# Patient Record
Sex: MALE | Race: WHITE | NOT HISPANIC OR LATINO
[De-identification: names, ages, dates, MRNs, and addresses within clinical notes are randomized per-mention and may not be internally consistent; named-entity substitution may affect disease eponyms.]

---

## 2017-04-23 ENCOUNTER — RECORD ABSTRACTING (OUTPATIENT)
Age: 82
End: 2017-04-23

## 2017-04-23 DIAGNOSIS — E55.9 VITAMIN D DEFICIENCY, UNSPECIFIED: ICD-10-CM

## 2017-04-23 DIAGNOSIS — I70.209 UNSPECIFIED ATHEROSCLEROSIS OF NATIVE ARTERIES OF EXTREMITIES, UNSPECIFIED EXTREMITY: ICD-10-CM

## 2017-04-23 DIAGNOSIS — Z80.9 FAMILY HISTORY OF MALIGNANT NEOPLASM, UNSPECIFIED: ICD-10-CM

## 2017-04-23 DIAGNOSIS — D75.89 OTHER SPECIFIED DISEASES OF BLOOD AND BLOOD-FORMING ORGANS: ICD-10-CM

## 2017-04-23 DIAGNOSIS — N20.0 CALCULUS OF KIDNEY: ICD-10-CM

## 2017-04-23 DIAGNOSIS — B19.10 UNSPECIFIED VIRAL HEPATITIS B W/OUT HEPATIC COMA: ICD-10-CM

## 2017-04-23 DIAGNOSIS — R73.9 HYPERGLYCEMIA, UNSPECIFIED: ICD-10-CM

## 2017-04-23 DIAGNOSIS — K63.5 POLYP OF COLON: ICD-10-CM

## 2017-04-23 DIAGNOSIS — I65.23 OCCLUSION AND STENOSIS OF BILATERAL CAROTID ARTERIES: ICD-10-CM

## 2017-04-23 DIAGNOSIS — M10.9 GOUT, UNSPECIFIED: ICD-10-CM

## 2017-04-23 DIAGNOSIS — Z78.9 OTHER SPECIFIED HEALTH STATUS: ICD-10-CM

## 2017-04-23 DIAGNOSIS — I51.9 HEART DISEASE, UNSPECIFIED: ICD-10-CM

## 2017-04-23 DIAGNOSIS — I70.0 ATHEROSCLEROSIS OF AORTA: ICD-10-CM

## 2017-04-23 DIAGNOSIS — M19.90 UNSPECIFIED OSTEOARTHRITIS, UNSPECIFIED SITE: ICD-10-CM

## 2017-04-23 DIAGNOSIS — N41.9 INFLAMMATORY DISEASE OF PROSTATE, UNSPECIFIED: ICD-10-CM

## 2017-04-23 DIAGNOSIS — Z92.89 PERSONAL HISTORY OF OTHER MEDICAL TREATMENT: ICD-10-CM

## 2017-04-23 DIAGNOSIS — C84.00 MYCOSIS FUNGOIDES, UNSPECIFIED SITE: ICD-10-CM

## 2017-04-27 ENCOUNTER — APPOINTMENT (OUTPATIENT)
Dept: INTERNAL MEDICINE | Facility: CLINIC | Age: 82
End: 2017-04-27

## 2017-07-11 ENCOUNTER — APPOINTMENT (OUTPATIENT)
Dept: INTERNAL MEDICINE | Facility: CLINIC | Age: 82
End: 2017-07-11

## 2017-07-11 VITALS
TEMPERATURE: 97.4 F | DIASTOLIC BLOOD PRESSURE: 72 MMHG | SYSTOLIC BLOOD PRESSURE: 142 MMHG | OXYGEN SATURATION: 98 % | HEIGHT: 72 IN | WEIGHT: 164 LBS | RESPIRATION RATE: 16 BRPM | HEART RATE: 80 BPM | BODY MASS INDEX: 22.21 KG/M2

## 2017-07-11 DIAGNOSIS — F32.9 MAJOR DEPRESSIVE DISORDER, SINGLE EPISODE, UNSPECIFIED: ICD-10-CM

## 2017-07-11 DIAGNOSIS — R63.4 ABNORMAL WEIGHT LOSS: ICD-10-CM

## 2017-07-19 LAB
APPEARANCE: CLEAR
BACTERIA: NEGATIVE
BILIRUBIN URINE: NEGATIVE
BLOOD URINE: NEGATIVE
COLOR: YELLOW
GLUCOSE QUALITATIVE U: NORMAL MG/DL
HYALINE CASTS: 3 /LPF
KETONES URINE: NEGATIVE
LEUKOCYTE ESTERASE URINE: ABNORMAL
MICROSCOPIC-UA: NORMAL
NITRITE URINE: NEGATIVE
PH URINE: 6
PROTEIN URINE: NEGATIVE MG/DL
RED BLOOD CELLS URINE: 2 /HPF
SPECIFIC GRAVITY URINE: 1.02
SQUAMOUS EPITHELIAL CELLS: 1 /HPF
UROBILINOGEN URINE: NORMAL MG/DL
WHITE BLOOD CELLS URINE: 4 /HPF

## 2018-01-04 ENCOUNTER — APPOINTMENT (OUTPATIENT)
Dept: INTERNAL MEDICINE | Facility: CLINIC | Age: 83
End: 2018-01-04

## 2018-02-09 ENCOUNTER — APPOINTMENT (OUTPATIENT)
Dept: INTERNAL MEDICINE | Facility: CLINIC | Age: 83
End: 2018-02-09
Payer: MEDICARE

## 2018-02-09 ENCOUNTER — NON-APPOINTMENT (OUTPATIENT)
Age: 83
End: 2018-02-09

## 2018-02-09 VITALS
DIASTOLIC BLOOD PRESSURE: 82 MMHG | SYSTOLIC BLOOD PRESSURE: 142 MMHG | BODY MASS INDEX: 24.11 KG/M2 | TEMPERATURE: 97.7 F | WEIGHT: 178 LBS | HEART RATE: 72 BPM | RESPIRATION RATE: 18 BRPM | OXYGEN SATURATION: 99 % | HEIGHT: 72 IN

## 2018-02-09 PROCEDURE — 93000 ELECTROCARDIOGRAM COMPLETE: CPT

## 2018-02-09 PROCEDURE — 99214 OFFICE O/P EST MOD 30 MIN: CPT | Mod: 25

## 2018-06-18 ENCOUNTER — RX RENEWAL (OUTPATIENT)
Age: 83
End: 2018-06-18

## 2018-07-02 ENCOUNTER — RX RENEWAL (OUTPATIENT)
Age: 83
End: 2018-07-02

## 2018-08-17 ENCOUNTER — APPOINTMENT (OUTPATIENT)
Dept: INTERNAL MEDICINE | Facility: CLINIC | Age: 83
End: 2018-08-17
Payer: MEDICARE

## 2018-08-17 VITALS
RESPIRATION RATE: 16 BRPM | HEIGHT: 72 IN | OXYGEN SATURATION: 97 % | DIASTOLIC BLOOD PRESSURE: 84 MMHG | WEIGHT: 180 LBS | BODY MASS INDEX: 24.38 KG/M2 | SYSTOLIC BLOOD PRESSURE: 140 MMHG | HEART RATE: 80 BPM | TEMPERATURE: 97.7 F

## 2018-08-17 PROCEDURE — G0438: CPT

## 2018-08-17 RX ORDER — ASPIRIN 81 MG/1
81 TABLET ORAL DAILY
Refills: 0 | Status: ACTIVE | COMMUNITY

## 2018-08-17 RX ORDER — CHROMIUM 200 MCG
TABLET ORAL
Refills: 0 | Status: ACTIVE | COMMUNITY

## 2018-08-17 RX ORDER — MULTIVITAMIN
TABLET ORAL DAILY
Refills: 0 | Status: ACTIVE | COMMUNITY

## 2018-08-17 RX ORDER — BUPROPION HYDROCHLORIDE 100 MG/1
100 TABLET, FILM COATED, EXTENDED RELEASE ORAL TWICE DAILY
Qty: 60 | Refills: 11 | Status: DISCONTINUED | COMMUNITY
Start: 2017-07-11 | End: 2018-08-17

## 2018-08-17 RX ORDER — VIT A/VIT C/VIT E/ZINC/COPPER 4296-226
CAPSULE ORAL
Refills: 0 | Status: ACTIVE | COMMUNITY

## 2018-08-17 NOTE — REVIEW OF SYSTEMS
[Negative] : Psychiatric [FreeTextEntry9] : See history of present illness [de-identified] : See history of present illness

## 2018-08-17 NOTE — HEALTH RISK ASSESSMENT
[Good] : ~his/her~ current health as good [Very Good] : ~his/her~  mood as very good [No falls in past year] : Patient reported no falls in the past year [0] : 2) Feeling down, depressed, or hopeless: Not at all (0) [Patient declined mammogram] : Patient declined mammogram [Patient declined PAP Smear] : Patient declined PAP Smear [Patient declined bone density test] : Patient declined bone density test [Patient reported colonoscopy was normal] : Patient reported colonoscopy was normal [HIV test declined] : HIV test declined [Hepatitis C test declined] : Hepatitis C test declined [None] : None [With Significant Other] : lives with significant other [# of Members in Household ___] :  household currently consist of [unfilled] member(s) [Retired] : retired [College] : College [] :  [# Of Children ___] : has [unfilled] children [Feels Safe at Home] : Feels safe at home [Fully functional (bathing, dressing, toileting, transferring, walking, feeding)] : Fully functional (bathing, dressing, toileting, transferring, walking, feeding) [Fully functional (using the telephone, shopping, preparing meals, housekeeping, doing laundry, using] : Fully functional and needs no help or supervision to perform IADLs (using the telephone, shopping, preparing meals, housekeeping, doing laundry, using transportation, managing medications and managing finances) [Smoke Detector] : smoke detector [Carbon Monoxide Detector] : carbon monoxide detector [Guns at Home] : guns at home [Safety elements used in home] : safety elements used in home [Seat Belt] :  uses seat belt [Sunscreen] : uses sunscreen [] : No [de-identified] : drinks red wine with dinner 4-5/week [Change in mental status noted] : No change in mental status noted [Language] : denies difficulty with language [Behavior] : denies difficulty with behavior [Learning/Retaining New Information] : denies difficulty learning/retaining new information [Handling Complex Tasks] : denies difficulty handling complex tasks [Reasoning] : denies difficulty with reasoning [Spatial Ability and Orientation] : denies difficulty with spatial ability and orientation [Sexually Active] : not sexually active [High Risk Behavior] : no high risk behavior [Reports changes in hearing] : Reports no changes in hearing [Reports changes in vision] : Reports no changes in vision [Reports changes in dental health] : Reports no changes in dental health [Travel to Developing Areas] : does not  travel to developing areas [TB Exposure] : is not being exposed to tuberculosis [Caregiver Concerns] : does not have caregiver concerns [ColonoscopyDate] : 2012

## 2018-08-17 NOTE — HISTORY OF PRESENT ILLNESS
[de-identified] : The patient for a wellness evaluation.\par \par Overall, at this time, the patient states that he is fairly stable. 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 He continues to receive Keytruda every 3 weeks intravenously. His last set of CAT scans were performed in April of this year, and he states that they were stable. I never received the results unfortunately. His next set of radiographic studies is due next month. He does complain of mild and generalized fatigue. He sleeps approximately 10 hours per night.\par \par The patient has not been exercising much due to the significant lymphedema of the right lower extremity. He does wear a lymphedema pump every night. He denies having any fevers chills night sweats. His appetite is excellent. There has been no significant change in his weight.\par \par Patient has been seen by his cardiologist. He underwent a carotid duplex study which revealed a mild to moderate amount of plaque. He was told to just remain on aspirin therapy. He denies any chest pains. There has been no change in bowel habits. There are no other constitutional symptoms at this time. He now comes in for this assessment.

## 2018-08-17 NOTE — PHYSICAL EXAM
[General Appearance - Alert] : alert [General Appearance - In No Acute Distress] : in no acute distress [Sclera] : the sclera and conjunctiva were normal [PERRL With Normal Accommodation] : pupils were equal in size, round, and reactive to light [Extraocular Movements] : extraocular movements were intact [Outer Ear] : the ears and nose were normal in appearance [Oropharynx] : the oropharynx was normal [Neck Appearance] : the appearance of the neck was normal [Neck Cervical Mass (___cm)] : no neck mass was observed [Jugular Venous Distention Increased] : there was no jugular-venous distention [Thyroid Diffuse Enlargement] : the thyroid was not enlarged [Thyroid Nodule] : there were no palpable thyroid nodules [Heart Sounds] : normal S1 and S2 [Heart Sounds Pericardial Friction Rub] : no pericardial rub [Bowel Sounds] : normal bowel sounds [Abdomen Soft] : soft [Abdomen Tenderness] : non-tender [] : no hepato-splenomegaly [FreeTextEntry1] : Right inguinal fullness [No CVA Tenderness] : no ~M costovertebral angle tenderness [Nail Clubbing] : no clubbing  or cyanosis of the fingernails

## 2018-08-17 NOTE — PLAN
[FreeTextEntry1] : 1. Continue with medication as outlined above.\par \par 2. Continue with close oncologic followup with his team at [facility] regarding the treatment of his metastatic squamous cell carcinoma. He will continue with the infusions of Keytruda under their direction. He states that he may go to a once a month regimen in the near future.\par \par 3. Await the results of the followup radiographic studies which are scheduled for September. I told him that I would appreciate the results for my review especially of the chest, given the fact that he did have some nodular densities noted previously.\par \par 4. The patient will contact his gastroenterologist, Dr. Us regarding the timing of his next colonoscopy which is due.\par \par 5. Continue with the lymphedema pump nocturnally.\par \par 6. The patient has been instructed to increase his vitamin D to 4000 units daily.\par \par 7. Routine urologic followup with Dr. Duran.\par \par 8. Follow up with myself in 6 months with office visit and a blood pressure check.

## 2018-11-09 ENCOUNTER — OUTPATIENT (OUTPATIENT)
Dept: OUTPATIENT SERVICES | Facility: HOSPITAL | Age: 83
LOS: 1 days | End: 2018-11-09
Payer: MEDICARE

## 2018-11-09 ENCOUNTER — APPOINTMENT (OUTPATIENT)
Dept: INTERNAL MEDICINE | Facility: CLINIC | Age: 83
End: 2018-11-09
Payer: MEDICARE

## 2018-11-09 ENCOUNTER — APPOINTMENT (OUTPATIENT)
Dept: RADIOLOGY | Facility: CLINIC | Age: 83
End: 2018-11-09

## 2018-11-09 VITALS
RESPIRATION RATE: 16 BRPM | BODY MASS INDEX: 24.52 KG/M2 | OXYGEN SATURATION: 98 % | DIASTOLIC BLOOD PRESSURE: 82 MMHG | WEIGHT: 181 LBS | HEIGHT: 72 IN | TEMPERATURE: 98.8 F | SYSTOLIC BLOOD PRESSURE: 140 MMHG | HEART RATE: 78 BPM

## 2018-11-09 DIAGNOSIS — Z00.8 ENCOUNTER FOR OTHER GENERAL EXAMINATION: ICD-10-CM

## 2018-11-09 DIAGNOSIS — Z86.79 PERSONAL HISTORY OF OTHER DISEASES OF THE CIRCULATORY SYSTEM: ICD-10-CM

## 2018-11-09 LAB
ANION GAP SERPL CALC-SCNC: 11 MMOL/L
APPEARANCE: ABNORMAL
BACTERIA: NEGATIVE
BASOPHILS # BLD AUTO: 0.03 K/UL
BASOPHILS NFR BLD AUTO: 0.3 %
BILIRUBIN URINE: NEGATIVE
BLOOD URINE: NEGATIVE
BUN SERPL-MCNC: 24 MG/DL
CALCIUM SERPL-MCNC: 9.3 MG/DL
CHLORIDE SERPL-SCNC: 100 MMOL/L
CO2 SERPL-SCNC: 26 MMOL/L
COLOR: YELLOW
CREAT SERPL-MCNC: 0.87 MG/DL
EOSINOPHIL # BLD AUTO: 0.07 K/UL
EOSINOPHIL NFR BLD AUTO: 0.6 %
GLUCOSE QUALITATIVE U: NEGATIVE MG/DL
GLUCOSE SERPL-MCNC: 96 MG/DL
HCT VFR BLD CALC: 37.8 %
HGB BLD-MCNC: 12.7 G/DL
HYALINE CASTS: 4 /LPF
IMM GRANULOCYTES NFR BLD AUTO: 0.3 %
KETONES URINE: NEGATIVE
LEUKOCYTE ESTERASE URINE: ABNORMAL
LYMPHOCYTES # BLD AUTO: 1.55 K/UL
LYMPHOCYTES NFR BLD AUTO: 13.8 %
MAN DIFF?: NORMAL
MCHC RBC-ENTMCNC: 32.9 PG
MCHC RBC-ENTMCNC: 33.6 GM/DL
MCV RBC AUTO: 97.9 FL
MICROSCOPIC-UA: NORMAL
MONOCYTES # BLD AUTO: 0.91 K/UL
MONOCYTES NFR BLD AUTO: 8.1 %
NEUTROPHILS # BLD AUTO: 8.68 K/UL
NEUTROPHILS NFR BLD AUTO: 76.9 %
NITRITE URINE: NEGATIVE
PH URINE: 5.5
PLATELET # BLD AUTO: 260 K/UL
POTASSIUM SERPL-SCNC: 4.2 MMOL/L
PROTEIN URINE: NEGATIVE MG/DL
RBC # BLD: 3.86 M/UL
RBC # FLD: 14.6 %
RED BLOOD CELLS URINE: 2 /HPF
SODIUM SERPL-SCNC: 137 MMOL/L
SPECIFIC GRAVITY URINE: 1.02
SQUAMOUS EPITHELIAL CELLS: 12 /HPF
UROBILINOGEN URINE: NEGATIVE MG/DL
WBC # FLD AUTO: 11.27 K/UL
WHITE BLOOD CELLS URINE: 29 /HPF

## 2018-11-09 PROCEDURE — 71046 X-RAY EXAM CHEST 2 VIEWS: CPT

## 2018-11-09 PROCEDURE — 71046 X-RAY EXAM CHEST 2 VIEWS: CPT | Mod: 26

## 2018-11-09 PROCEDURE — 99213 OFFICE O/P EST LOW 20 MIN: CPT

## 2018-11-09 NOTE — HISTORY OF PRESENT ILLNESS
[FreeTextEntry8] : Pt presents  to the office today with complaints of fever of 103 F  that lasted for 4 hours on Tuesday this week, 48 hours ago. Per pt, it occurred suddenly- he took Tylenol extra strength q 4 hrs round the clock . He had no fever yesterday or this am. He has a history of metastatic squamous cell cancer. he is treated at [facility] with Keytruda every three weeks by Dr. Ferris, oncologist. \par \par Since Tuesday , he has not taken his BP med, Valsartan. He has been keeping  a log of his BP's at home , His SBP has been ranging 100-120's,  DBP 50--70's. He denies lightheadedness, dizziness.  He is maintaining po and fluid intake . \par He denies cough , sputum production , wheezing , pleuritic pain, night sweats. \par

## 2018-11-09 NOTE — ASSESSMENT
[FreeTextEntry1] :  Pr will complete BW- UA/C&S, CBC, BMP now\par CXRY PA/LA  [facility]   \par   Pt will continue to take BP's at home and keep log.Report  SBP <100 or >140's \par Hold Valsartan\par  F/up with BW , CXRY results \par F/up oncology, Dr. Ferris \par Above plan discussed with  \par  \par  \par

## 2018-11-09 NOTE — PHYSICAL EXAM
[No Acute Distress] : no acute distress [Well Nourished] : well nourished [Normal Outer Ear/Nose] : the outer ears and nose were normal in appearance [Normal Oropharynx] : the oropharynx was normal [Normal TMs] : both tympanic membranes were normal [Supple] : supple [No Lymphadenopathy] : no lymphadenopathy [No Respiratory Distress] : no respiratory distress  [Clear to Auscultation] : lungs were clear to auscultation bilaterally [No Accessory Muscle Use] : no accessory muscle use [Normal Rate] : normal rate  [Regular Rhythm] : with a regular rhythm [Normal S1, S2] : normal S1 and S2 [Normal Posterior Cervical Nodes] : no posterior cervical lymphadenopathy [Normal Anterior Cervical Nodes] : no anterior cervical lymphadenopathy [Grossly Normal Strength/Tone] : grossly normal strength/tone [Coordination Grossly Intact] : coordination grossly intact [No Focal Deficits] : no focal deficits [Normal Affect] : the affect was normal [Normal Insight/Judgement] : insight and judgment were intact

## 2018-11-09 NOTE — REVIEW OF SYSTEMS
[Fever] : fever [Chills] : chills [Lower Ext Edema] : lower extremity edema [Negative] : Neurological [Chest Pain] : no chest pain [Palpitations] : no palpitations [FreeTextEntry5] : see HPI

## 2018-11-09 NOTE — ADDENDUM
[FreeTextEntry1] : CXRY resulted negative. CBC - WBC- 11.27, UA + moderate leukocytes, + WBC, +RBC . urine culture  pending . Discussed results with Dr. Carl . Pt contacted and agreeable to start  Cipro 500 mg po BID x 7 days . Will f/up with culture results.

## 2018-11-13 LAB — BACTERIA UR CULT: NORMAL

## 2019-02-08 ENCOUNTER — APPOINTMENT (OUTPATIENT)
Dept: INTERNAL MEDICINE | Facility: CLINIC | Age: 84
End: 2019-02-08
Payer: MEDICARE

## 2019-02-08 VITALS
DIASTOLIC BLOOD PRESSURE: 76 MMHG | TEMPERATURE: 97.9 F | SYSTOLIC BLOOD PRESSURE: 120 MMHG | WEIGHT: 178 LBS | HEART RATE: 94 BPM | RESPIRATION RATE: 16 BRPM | HEIGHT: 72 IN | BODY MASS INDEX: 24.11 KG/M2 | OXYGEN SATURATION: 97 %

## 2019-02-08 PROCEDURE — 99214 OFFICE O/P EST MOD 30 MIN: CPT | Mod: 25

## 2019-02-08 RX ORDER — CIPROFLOXACIN HYDROCHLORIDE 500 MG/1
500 TABLET, FILM COATED ORAL
Qty: 14 | Refills: 0 | Status: DISCONTINUED | COMMUNITY
Start: 2018-11-09 | End: 2019-02-08

## 2019-02-08 NOTE — PLAN
[FreeTextEntry1] : 1. Continue with medication as outlined above.\par \par 2. Will now administer a trial of Levaquin 500 mg daily for 7 days to treat his upper respiratory infection. Note is the fact that the patient is immunocompromised.\par \par 3. The patient will reinstitute Flonase 2 squirts in each tube b.i.d.\par \par 4. Await followup surveillance CAT scans. This will be ordered by his oncologist at [facility].\par \par 5. Cardiac followup in March.\par \par 6. Follow up with myself in 6 months with a wellness evaluation. He will undergo a yearly fasting blood work several days prior to that visit.\par \par 7. He will contact his gastroenterologist, about the possibility of undergoing a Cologard analysis, instead of a followup colonoscopy.

## 2019-02-08 NOTE — PHYSICAL EXAM
[General Appearance - Alert] : alert [General Appearance - Well Developed] : well developed [Outer Ear] : the ears and nose were normal in appearance [Oropharynx] : the oropharynx was normal [Neck Appearance] : the appearance of the neck was normal [Neck Cervical Mass (___cm)] : no neck mass was observed [Jugular Venous Distention Increased] : there was no jugular-venous distention [Thyroid Diffuse Enlargement] : the thyroid was not enlarged [Thyroid Nodule] : there were no palpable thyroid nodules [Auscultation Breath Sounds / Voice Sounds] : lungs were clear to auscultation bilaterally [Heart Sounds] : normal S1 and S2 [Heart Sounds Pericardial Friction Rub] : no pericardial rub [Bowel Sounds] : normal bowel sounds [Abdomen Soft] : soft [Abdomen Tenderness] : non-tender [] : no hepato-splenomegaly [Cervical Lymph Nodes Enlarged Posterior Bilaterally] : posterior cervical [Cervical Lymph Nodes Enlarged Anterior Bilaterally] : anterior cervical [Supraclavicular Lymph Nodes Enlarged Bilaterally] : supraclavicular [Nail Clubbing] : no clubbing  or cyanosis of the fingernails [FreeTextEntry1] : Carotids are 1-2+ bilaterally. There is trace to 1+ edema on the left, and 1-2+ edema on the right

## 2019-07-18 ENCOUNTER — MEDICATION RENEWAL (OUTPATIENT)
Age: 84
End: 2019-07-18

## 2019-08-30 ENCOUNTER — APPOINTMENT (OUTPATIENT)
Dept: INTERNAL MEDICINE | Facility: CLINIC | Age: 84
End: 2019-08-30
Payer: MEDICARE

## 2019-08-30 VITALS
BODY MASS INDEX: 24.52 KG/M2 | OXYGEN SATURATION: 98 % | HEIGHT: 72 IN | HEART RATE: 87 BPM | WEIGHT: 181 LBS | SYSTOLIC BLOOD PRESSURE: 140 MMHG | DIASTOLIC BLOOD PRESSURE: 80 MMHG | TEMPERATURE: 98 F | RESPIRATION RATE: 16 BRPM

## 2019-08-30 PROCEDURE — G0439: CPT

## 2019-08-30 PROCEDURE — G0438: CPT

## 2019-08-30 RX ORDER — LEVOFLOXACIN 500 MG/1
500 TABLET, FILM COATED ORAL DAILY
Qty: 7 | Refills: 0 | Status: DISCONTINUED | COMMUNITY
Start: 2019-02-08 | End: 2019-08-30

## 2019-08-30 NOTE — HEALTH RISK ASSESSMENT
[Monthly or less (1 pt)] : Monthly or less (1 point) [Yes] : Yes [Never (0 pts)] : Never (0 points) [1 or 2 (0 pts)] : 1 or 2 (0 points) [No] : In the past 12 months have you used drugs other than those required for medical reasons? No [Smoke Detector] : smoke detector [Carbon Monoxide Detector] : carbon monoxide detector [Seat Belt] :  uses seat belt [] : No [de-identified] : used to smoke [de-identified] : occasional [YearQuit] : 1990 [Guns at Home] : no guns at home [Sunscreen] : does not use sunscreen [ColonoscopyDate] : 01/13

## 2019-08-30 NOTE — PLAN
[FreeTextEntry1] : 1. Continue with medication as outlined above.\par \par 2. His dermatologist has recommended that he not receive the new shingles vaccine.\par \par 3. Await results of colonoscopy which is scheduled for next week.\par \par 4. Routine urologic followup with Dr. Duran. Of note is the fact that his PSA has increased from 1.5, to 3.1. This demonstrates a significant rate of rise.\par \par 5. Flu shot in the fall.\par \par 6. Follow up with myself in 6 months with office visit and blood pressure check.

## 2019-08-30 NOTE — HISTORY OF PRESENT ILLNESS
[de-identified] : Patient comes in today for a wellness evaluation.\par \par Overall, the patient remains stable. He continues to undergo treatment for his metastatic squamous cell cancer with immunotherapy in the form of Keytruda. He receives this medication every 3 weeks under the direction of his oncologist. He continues to tolerate it fairly well. He has remained stable. His last set of CAT scans was performed in February, and he is scheduled to undergo repeat scans next month.\par \par The patient continues to have issues with chronic lymphedema of the right lower extremity. The lymph edema does extend into his scrotum. He has been compliant with a compressive device on a daily basis.\par \par The patient continues to monitor his blood pressure at home. The systolic ranges between 120 and 130, and the diastolic between 70 and 80. He does not perform any exercise now due to the significant lymphedema. He is scheduled to undergo colonoscopy on 9/5/19. He now comes in for this assessment.

## 2019-09-09 ENCOUNTER — MEDICATION RENEWAL (OUTPATIENT)
Age: 84
End: 2019-09-09

## 2019-11-04 ENCOUNTER — NON-APPOINTMENT (OUTPATIENT)
Age: 84
End: 2019-11-04

## 2019-11-04 ENCOUNTER — MEDICATION RENEWAL (OUTPATIENT)
Age: 84
End: 2019-11-04

## 2019-11-04 ENCOUNTER — APPOINTMENT (OUTPATIENT)
Dept: INTERNAL MEDICINE | Facility: CLINIC | Age: 84
End: 2019-11-04
Payer: MEDICARE

## 2019-11-04 DIAGNOSIS — H26.9 UNSPECIFIED CATARACT: ICD-10-CM

## 2019-11-04 PROCEDURE — 93000 ELECTROCARDIOGRAM COMPLETE: CPT

## 2019-11-04 PROCEDURE — 99215 OFFICE O/P EST HI 40 MIN: CPT | Mod: 25

## 2019-11-04 RX ORDER — PEMBROLIZUMAB 25 MG/ML
100 INJECTION, SOLUTION INTRAVENOUS
Refills: 0 | Status: DISCONTINUED | COMMUNITY
End: 2019-11-04

## 2019-11-04 NOTE — HISTORY OF PRESENT ILLNESS
[No Adverse Anesthesia Reaction] : no adverse anesthesia reaction in self or family member [(Patient denies any chest pain, claudication, dyspnea on exertion, orthopnea, palpitations or syncope)] : Patient denies any chest pain, claudication, dyspnea on exertion, orthopnea, palpitations or syncope [FreeTextEntry1] : Left Cataract  [FreeTextEntry2] : 11/7/19 [FreeTextEntry3] : Dr.Vincent Shay [FreeTextEntry4] : Patient is a 84-year-old male with history of metastatic squamous cell carcinoma comes in for preoperative evaluation. He scheduled for a left cataract repair on November 7 and right cataract repair on December 5.\par He recently had CT scan abdomen and pelvis done with his oncologist and was found to have new pulmonary nodules. He is having a repeat CT scan next week with followup with oncology. His last dose of Keytruda is October 10, 2019.\par Patient denies any cp, sob,abdominal pain, nausea, vomiting, palpitations, fever, chills, constipation, diarrhea.\par \par Anesthesia History: Mr. ELVIN RASMUSSEN has had no adverse effects to anesthesia in the past. \par \par Functional Capacity-Walking 1-2 blocks or climbing stairs symptoms: Mr. ELVIN RASMUSSEN denies any symptoms of chest pain, HERNANDEZ, SOB or palpitations.\par

## 2019-11-04 NOTE — ASSESSMENT
[Patient Optimized for Surgery] : Patient optimized for surgery [FreeTextEntry4] : Patient is moderate risk for low risk procedure.\par \par Adequate oxygen monitoring. DVT prophylaxis.\par Continue current medications as directed.\par \par Follow up with oncology  next week.

## 2020-01-21 RX ORDER — VALSARTAN 80 MG/1
80 TABLET, COATED ORAL DAILY
Qty: 90 | Refills: 1 | Status: DISCONTINUED | COMMUNITY
Start: 2017-07-11 | End: 2020-01-21

## 2020-03-06 ENCOUNTER — APPOINTMENT (OUTPATIENT)
Dept: INTERNAL MEDICINE | Facility: CLINIC | Age: 85
End: 2020-03-06
Payer: MEDICARE

## 2020-03-06 VITALS
DIASTOLIC BLOOD PRESSURE: 80 MMHG | TEMPERATURE: 97.8 F | BODY MASS INDEX: 22.38 KG/M2 | RESPIRATION RATE: 16 BRPM | HEIGHT: 75 IN | WEIGHT: 180 LBS | OXYGEN SATURATION: 98 % | HEART RATE: 76 BPM | SYSTOLIC BLOOD PRESSURE: 142 MMHG

## 2020-03-06 DIAGNOSIS — N28.89 OTHER SPECIFIED DISORDERS OF KIDNEY AND URETER: ICD-10-CM

## 2020-03-06 PROCEDURE — 99214 OFFICE O/P EST MOD 30 MIN: CPT

## 2020-03-06 RX ORDER — OLMESARTAN MEDOXOMIL 20 MG/1
20 TABLET, FILM COATED ORAL
Qty: 90 | Refills: 3 | Status: DISCONTINUED | COMMUNITY
Start: 2020-01-21 | End: 2020-03-06

## 2020-03-06 RX ORDER — TAMSULOSIN HYDROCHLORIDE 0.4 MG/1
0.4 CAPSULE ORAL
Qty: 60 | Refills: 0 | Status: ACTIVE | COMMUNITY
Start: 2017-11-29

## 2020-03-06 NOTE — HISTORY OF PRESENT ILLNESS
[de-identified] : The patient comes in today for a followup evaluation.\par \par At this time, the patient states he is doing relatively well. He continues to be followed by his medical oncologist for treatment of his metastatic squamous cell cancer. He was noted to have several nodular densities on his CAT scan that was performed in September. He decided to discontinue his immunotherapy. He had been receiving Keytruda. The plan was to observe him clinically, and to repeat another CAT scan in 6 months. This will be performed later in the month.\par \par The patient states that his appetite has been good. He still has issues with the lymphedema of the right lower extremity. He does also have a hydrocele which he is contemplating surgical repair. He denies any chest pains. There are no fevers or night sweats. His appetite is good. He exercises by walking fairly regularly. He now comes in for this assessment.

## 2020-03-06 NOTE — PLAN
[FreeTextEntry1] : 1. Continue with medication as outlined above.\par \par 2. Overweight the followup CAT scan of the chest which is scheduled for later this month. This has been ordered by his hematologist at [facility].\par \par 3. Increase exercise regimen as tolerated.\par \par 4. Continue close urologic followup.\par \par 5. Follow up with myself in 6 months with a wellness evaluation. He will undergo yearly routine fasting blood work prior to that visit.

## 2020-03-06 NOTE — PHYSICAL EXAM
[General Appearance - Alert] : alert [General Appearance - Well Developed] : well developed [Outer Ear] : the ears and nose were normal in appearance [Oropharynx] : the oropharynx was normal [Neck Appearance] : the appearance of the neck was normal [Neck Cervical Mass (___cm)] : no neck mass was observed [Jugular Venous Distention Increased] : there was no jugular-venous distention [Thyroid Diffuse Enlargement] : the thyroid was not enlarged [Thyroid Nodule] : there were no palpable thyroid nodules [Auscultation Breath Sounds / Voice Sounds] : lungs were clear to auscultation bilaterally [Heart Sounds] : normal S1 and S2 [Heart Sounds Pericardial Friction Rub] : no pericardial rub [FreeTextEntry1] : Carotids are 1-2+ bilaterally. There is trace to 1+ edema on the left, and 1-2+ edema on the right [Bowel Sounds] : normal bowel sounds [Abdomen Soft] : soft [Abdomen Tenderness] : non-tender [] : no hepato-splenomegaly [Cervical Lymph Nodes Enlarged Posterior Bilaterally] : posterior cervical [Cervical Lymph Nodes Enlarged Anterior Bilaterally] : anterior cervical [Supraclavicular Lymph Nodes Enlarged Bilaterally] : supraclavicular [Nail Clubbing] : no clubbing  or cyanosis of the fingernails

## 2020-03-11 RX ORDER — VALSARTAN 80 MG/1
80 TABLET, COATED ORAL DAILY
Qty: 30 | Refills: 11 | Status: DISCONTINUED | COMMUNITY
End: 2020-03-11

## 2020-03-12 ENCOUNTER — APPOINTMENT (OUTPATIENT)
Dept: INTERNAL MEDICINE | Facility: CLINIC | Age: 85
End: 2020-03-12

## 2020-09-10 ENCOUNTER — LABORATORY RESULT (OUTPATIENT)
Age: 85
End: 2020-09-10

## 2020-09-16 ENCOUNTER — APPOINTMENT (OUTPATIENT)
Dept: INTERNAL MEDICINE | Facility: CLINIC | Age: 85
End: 2020-09-16
Payer: MEDICARE

## 2020-09-16 VITALS
TEMPERATURE: 98.2 F | WEIGHT: 182 LBS | OXYGEN SATURATION: 98 % | SYSTOLIC BLOOD PRESSURE: 124 MMHG | RESPIRATION RATE: 16 BRPM | HEIGHT: 75 IN | HEART RATE: 94 BPM | DIASTOLIC BLOOD PRESSURE: 50 MMHG | BODY MASS INDEX: 22.63 KG/M2

## 2020-09-16 PROCEDURE — G0439: CPT

## 2020-09-16 NOTE — PHYSICAL EXAM
[General Appearance - Alert] : alert [General Appearance - In No Acute Distress] : in no acute distress [Sclera] : the sclera and conjunctiva were normal [PERRL With Normal Accommodation] : pupils were equal in size, round, and reactive to light [Extraocular Movements] : extraocular movements were intact [Outer Ear] : the ears and nose were normal in appearance [Oropharynx] : the oropharynx was normal [Neck Appearance] : the appearance of the neck was normal [Neck Cervical Mass (___cm)] : no neck mass was observed [Jugular Venous Distention Increased] : there was no jugular-venous distention [Thyroid Diffuse Enlargement] : the thyroid was not enlarged [Thyroid Nodule] : there were no palpable thyroid nodules [Heart Sounds] : normal S1 and S2 [Heart Sounds Pericardial Friction Rub] : no pericardial rub [Bowel Sounds] : normal bowel sounds [Abdomen Soft] : soft [Abdomen Tenderness] : non-tender [] : no hepato-splenomegaly [FreeTextEntry1] : There is a 3 cm mass noted in the left inguinal canal. [No CVA Tenderness] : no ~M costovertebral angle tenderness [Nail Clubbing] : no clubbing  or cyanosis of the fingernails

## 2020-09-16 NOTE — DATA REVIEWED
[FreeTextEntry1] : CAT scans of the chest abdomen and pelvis are reviewed.\par \par Yearly routine blood work is reviewed with the patient.

## 2020-09-16 NOTE — PLAN
[FreeTextEntry1] : 1. Continue with medication as outlined above.\par \par 2. Flu shot next month.\par \par 3. The patient is scheduled to undergo an MRI of the brain as per his oncologist.\par \par 4. Await PET CT scan which is scheduled for next week.\par \par 5. The patient will consider a return to the lymphedema clinic.\par \par 6. Followup with myself in 6 months with office visit blood pressure check.

## 2020-09-16 NOTE — HISTORY OF PRESENT ILLNESS
[de-identified] : The patient comes in today for a wellness evaluation.\par \par Overall, the patient states that he is fairly stable. The patient, continues to be followed by his medical oncologist , at [facility]. He underwent routine CAT scans of the chest, abdomen, and pelvis in August, he was noted to have a new 3 cm lymph node present in the left inguinal region. This was also subsequently palpated by his oncologist. The patient has been off of his immunotherapy, namely Keytruda, for more than a year now. CT scan next week, and further recommendations will be made at that time.\par \par The patient notes that his weight has been stable. His appetite is good. He still has significant lymphedema of the right lower extremity. He uses his lymphedema pump several times a week.\par \par The patient was seen by his urologist. He has had multiple urinary tract infections this year, including episodes in March, July, and earlier this month. He has not had change in bowel habits. He remains active, but he has not been exercising much due to his condition. He now comes in for this assessment.

## 2020-09-16 NOTE — HEALTH RISK ASSESSMENT
[Yes] : Yes [No] : In the past 12 months have you used drugs other than those required for medical reasons? No [No Retinopathy] : No retinopathy [Patient reported colonoscopy was normal] : Patient reported colonoscopy was normal [Retired] : retired [Smoke Detector] : smoke detector [Guns at Home] : guns at home [Carbon Monoxide Detector] : carbon monoxide detector [Seat Belt] :  uses seat belt [Sunscreen] : uses sunscreen [] : No [de-identified] : former smoker  [YearQuit] : 1988 [de-identified] : 1-2 drinks per day  [EyeExamDate] : 08/20 [ColonoscopyDate] : 01/20 [ColonoscopyComments] : few polyps

## 2021-01-02 ENCOUNTER — TRANSCRIPTION ENCOUNTER (OUTPATIENT)
Age: 86
End: 2021-01-02

## 2021-03-30 ENCOUNTER — APPOINTMENT (OUTPATIENT)
Dept: INTERNAL MEDICINE | Facility: CLINIC | Age: 86
End: 2021-03-30
Payer: MEDICARE

## 2021-03-30 VITALS
OXYGEN SATURATION: 96 % | TEMPERATURE: 96.4 F | HEART RATE: 100 BPM | DIASTOLIC BLOOD PRESSURE: 70 MMHG | SYSTOLIC BLOOD PRESSURE: 160 MMHG | WEIGHT: 182 LBS | HEIGHT: 72 IN | RESPIRATION RATE: 16 BRPM | BODY MASS INDEX: 24.65 KG/M2

## 2021-03-30 DIAGNOSIS — R30.0 DYSURIA: ICD-10-CM

## 2021-03-30 DIAGNOSIS — R10.11 RIGHT UPPER QUADRANT PAIN: ICD-10-CM

## 2021-03-30 DIAGNOSIS — N43.3 HYDROCELE, UNSPECIFIED: ICD-10-CM

## 2021-03-30 PROCEDURE — 99214 OFFICE O/P EST MOD 30 MIN: CPT

## 2021-03-30 NOTE — PLAN
[FreeTextEntry1] : One. Continue medication as outlined above.\par \par 2. Await the results of the PET CT scan which is scheduled for 4/2/21.\par \par 3. The patient will followup with his urologist at [facility]. He is contemplating a hydrocelectomy discomfort.\par \par 4. Orthopedic evaluation regarding his left knee pain\par \par 5. The patient undergo an abdominal sonogram to evaluate the right upper quadrant discomfort\par \par 6. Followup with myself in 6 months with a wellness evaluation and yearly routine fasting blood work.

## 2021-03-30 NOTE — PHYSICAL EXAM
[General Appearance - Alert] : alert [General Appearance - Well Developed] : well developed [Outer Ear] : the ears and nose were normal in appearance [Oropharynx] : the oropharynx was normal [Neck Appearance] : the appearance of the neck was normal [Jugular Venous Distention Increased] : there was no jugular-venous distention [Neck Cervical Mass (___cm)] : no neck mass was observed [Thyroid Diffuse Enlargement] : the thyroid was not enlarged [Thyroid Nodule] : there were no palpable thyroid nodules [Auscultation Breath Sounds / Voice Sounds] : lungs were clear to auscultation bilaterally [Heart Sounds] : normal S1 and S2 [Heart Sounds Pericardial Friction Rub] : no pericardial rub [FreeTextEntry1] : Carotids are 1-2+ bilaterally. There is trace to 1+ edema on the left, and 1-2+ edema on the right [Abdomen Soft] : soft [Bowel Sounds] : normal bowel sounds [Abdomen Tenderness] : non-tender [] : no hepato-splenomegaly [Cervical Lymph Nodes Enlarged Posterior Bilaterally] : posterior cervical [Cervical Lymph Nodes Enlarged Anterior Bilaterally] : anterior cervical [Supraclavicular Lymph Nodes Enlarged Bilaterally] : supraclavicular [Nail Clubbing] : no clubbing  or cyanosis of the fingernails

## 2021-03-30 NOTE — HISTORY OF PRESENT ILLNESS
[FreeTextEntry1] : The patient comes in today for a routine followup evaluation.\par  [de-identified] : The patient states, that he is doing fairly well. He continues treatment for his metastatic squamous cell carcinoma receiving immunotherapy every 3 weeks, in the formKeytruda. He is scheduled to undergo repeat surveillance PET CT scan on 4/2/21. His appetite has been good.\par \par The patient is having issues with residual lymphedema of the right lower extremity. He is compliant with his compression stockings. He does have issues with a right hydrocele and he is scheduled to followup with his urologist. He is strongly contemplating surgery. He is on tamsulosin for treatment of his BPH. He takes 0.8 mg. At times, he does note lightheadedness if he gets up quickly.\par \par The patient is complaining of discomfort in his left knee. He is scheduled to see an orthopedic surgeon in the future. He states that he denies any specific one major incident that may have caused the problem.\par \par The patient has recently been complaining of fullness in his right upper quadrant of his abdomen. He did not notice any relation to eating certain types of foods. His appetite has been good. His weight has been stable. He denies constitutional symptoms. He has received 2 doses of the corona vaccine. He now comes in for this assessment.

## 2021-04-01 LAB
APPEARANCE: CLEAR
BACTERIA UR CULT: NORMAL
BACTERIA: NEGATIVE
BILIRUBIN URINE: NEGATIVE
BLOOD URINE: NEGATIVE
COLOR: YELLOW
GLUCOSE QUALITATIVE U: NEGATIVE
HYALINE CASTS: 0 /LPF
KETONES URINE: NEGATIVE
LEUKOCYTE ESTERASE URINE: ABNORMAL
MICROSCOPIC-UA: NORMAL
NITRITE URINE: NEGATIVE
PH URINE: 6
PROTEIN URINE: NORMAL
RED BLOOD CELLS URINE: 2 /HPF
SPECIFIC GRAVITY URINE: 1.02
SQUAMOUS EPITHELIAL CELLS: 1 /HPF
UROBILINOGEN URINE: NORMAL
WHITE BLOOD CELLS URINE: 21 /HPF

## 2021-04-02 ENCOUNTER — NON-APPOINTMENT (OUTPATIENT)
Age: 86
End: 2021-04-02

## 2021-09-20 ENCOUNTER — RX RENEWAL (OUTPATIENT)
Age: 86
End: 2021-09-20

## 2021-09-27 ENCOUNTER — LABORATORY RESULT (OUTPATIENT)
Age: 86
End: 2021-09-27

## 2021-10-05 ENCOUNTER — APPOINTMENT (OUTPATIENT)
Dept: INTERNAL MEDICINE | Facility: CLINIC | Age: 86
End: 2021-10-05
Payer: MEDICARE

## 2021-10-05 VITALS
HEART RATE: 78 BPM | HEIGHT: 72 IN | WEIGHT: 182 LBS | SYSTOLIC BLOOD PRESSURE: 122 MMHG | DIASTOLIC BLOOD PRESSURE: 78 MMHG | RESPIRATION RATE: 16 BRPM | OXYGEN SATURATION: 97 % | BODY MASS INDEX: 24.65 KG/M2 | TEMPERATURE: 97 F

## 2021-10-05 DIAGNOSIS — N40.0 BENIGN PROSTATIC HYPERPLASIA WITHOUT LOWER URINARY TRACT SYMPMS: ICD-10-CM

## 2021-10-05 PROCEDURE — G0439: CPT

## 2021-10-05 RX ORDER — FINASTERIDE 5 MG/1
5 TABLET, FILM COATED ORAL
Qty: 90 | Refills: 3 | Status: ACTIVE | COMMUNITY
Start: 2021-10-05

## 2021-10-05 NOTE — PHYSICAL EXAM
[General Appearance - Alert] : alert [General Appearance - In No Acute Distress] : in no acute distress [Sclera] : the sclera and conjunctiva were normal [PERRL With Normal Accommodation] : pupils were equal in size, round, and reactive to light [Extraocular Movements] : extraocular movements were intact [Outer Ear] : the ears and nose were normal in appearance [Oropharynx] : the oropharynx was normal [Neck Appearance] : the appearance of the neck was normal [Neck Cervical Mass (___cm)] : no neck mass was observed [Jugular Venous Distention Increased] : there was no jugular-venous distention [Thyroid Diffuse Enlargement] : the thyroid was not enlarged [Thyroid Nodule] : there were no palpable thyroid nodules [Heart Sounds] : normal S1 and S2 [Heart Sounds Pericardial Friction Rub] : no pericardial rub [Abdomen Soft] : soft [Bowel Sounds] : normal bowel sounds [Abdomen Tenderness] : non-tender [] : no hepato-splenomegaly [FreeTextEntry1] : There is a 3 cm mass noted in the left inguinal canal. [No CVA Tenderness] : no ~M costovertebral angle tenderness [Nail Clubbing] : no clubbing  or cyanosis of the fingernails

## 2021-10-05 NOTE — HEALTH RISK ASSESSMENT
[Yes] : Yes [4 or more  times a week (4 pts)] : 4 or more  times a week (4 points) [1 or 2 (0 pts)] : 1 or 2 (0 points) [Never (0 pts)] : Never (0 points) [No] : In the past 12 months have you used drugs other than those required for medical reasons? No [0] : 2) Feeling down, depressed, or hopeless: Not at all (0) [] : No

## 2021-10-05 NOTE — HISTORY OF PRESENT ILLNESS
[FreeTextEntry1] : The patient comes in for a yearly wellness evaluation\par  [de-identified] :  Ke the patient states that he is relatively stable at this time. He continues to be treated with immunotherapy, in the form ofuda, for his metastatic squamous cell carcinoma. He notes that his last PET CT scan performed in July of this year, was unremarkable.\par \par The patient continues to struggle with lymphedema of the right lower extremity. He is unable to exercise due to the discomfort in the leg. He denies any chest pains or overt breathlessness.\par \par The patient has noted decreased hearing in the right ear. He has had issues with wax buildup. He is compliant with his maintenance medications. He was seen by his cardiologist. Carotid duplex studies were performed earlier this year. He has received vaccination for the corona virus. He denies constitutional symptoms. He now comes in for assessment.

## 2021-10-05 NOTE — PLAN
[FreeTextEntry1] : 1. Continued medication as outlined above.\par \par 2. The patient will obtain a flu shot in the near future\par \par 3. The patient will obtain a booster for the corona virus vaccination\par \par 4. Patient has been referred back to ENT for removal of cerumen in the right external auditory canal\par \par 5. Patient continue oncologic followup at [facility] for his immunotherapy treatments with Keytruda\par \par 6. Follow up with myself in 6 months with an office visit a blood pressure check.

## 2022-03-09 ENCOUNTER — RX RENEWAL (OUTPATIENT)
Age: 87
End: 2022-03-09

## 2022-04-14 ENCOUNTER — APPOINTMENT (OUTPATIENT)
Dept: INTERNAL MEDICINE | Facility: CLINIC | Age: 87
End: 2022-04-14
Payer: MEDICARE

## 2022-04-14 VITALS
WEIGHT: 186 LBS | DIASTOLIC BLOOD PRESSURE: 72 MMHG | RESPIRATION RATE: 16 BRPM | TEMPERATURE: 97.9 F | HEART RATE: 69 BPM | OXYGEN SATURATION: 98 % | SYSTOLIC BLOOD PRESSURE: 146 MMHG | HEIGHT: 72 IN | BODY MASS INDEX: 25.19 KG/M2

## 2022-04-14 DIAGNOSIS — I89.0 LYMPHEDEMA, NOT ELSEWHERE CLASSIFIED: ICD-10-CM

## 2022-04-14 DIAGNOSIS — J06.9 ACUTE UPPER RESPIRATORY INFECTION, UNSPECIFIED: ICD-10-CM

## 2022-04-14 DIAGNOSIS — R42 DIZZINESS AND GIDDINESS: ICD-10-CM

## 2022-04-14 PROCEDURE — 99214 OFFICE O/P EST MOD 30 MIN: CPT

## 2022-04-14 NOTE — PLAN
[FreeTextEntry1] : 1.  Continue with medical regimen as outlined above.\par \par 2.  The patient will continue to follow-up with his oncologist regarding treatment of his metastatic squamous cell cancer with Keytruda.\par \par 3.  The patient will complete the course of Keflex 500 mg twice daily for 7 days total.  He will contact me if the above-noted symptoms persist or do not improve.\par \par 4.  We will now change the regimen of the valsartan to a dosage of 40 mg twice daily, instead of 80 mg at bedtime.  We will see if this helps to decrease his symptoms of lightheadedness.\par \par 5.  Activity as tolerated.\par \par 6.  Follow-up with myself in 6 months with a wellness evaluation and yearly routine fasting blood work.

## 2022-04-14 NOTE — PHYSICAL EXAM
[General Appearance - Alert] : alert [General Appearance - Well Developed] : well developed [Outer Ear] : the ears and nose were normal in appearance [Neck Appearance] : the appearance of the neck was normal [Oropharynx] : the oropharynx was normal [Neck Cervical Mass (___cm)] : no neck mass was observed [Jugular Venous Distention Increased] : there was no jugular-venous distention [Thyroid Diffuse Enlargement] : the thyroid was not enlarged [Thyroid Nodule] : there were no palpable thyroid nodules [Auscultation Breath Sounds / Voice Sounds] : lungs were clear to auscultation bilaterally [Heart Sounds] : normal S1 and S2 [Heart Sounds Pericardial Friction Rub] : no pericardial rub [FreeTextEntry1] : Carotids are 1-2+ bilaterally. There is trace to 1+ edema on the left, and 2+ edema on the right [Bowel Sounds] : normal bowel sounds [Abdomen Soft] : soft [Abdomen Tenderness] : non-tender [] : no hepato-splenomegaly [Cervical Lymph Nodes Enlarged Posterior Bilaterally] : posterior cervical [Cervical Lymph Nodes Enlarged Anterior Bilaterally] : anterior cervical [Supraclavicular Lymph Nodes Enlarged Bilaterally] : supraclavicular [Nail Clubbing] : no clubbing  or cyanosis of the fingernails

## 2022-04-14 NOTE — HISTORY OF PRESENT ILLNESS
[FreeTextEntry1] : The patient comes in today for a routine followup evaluation.\par  [de-identified] : The patient states, that he continues to be followed carefully by his medical oncologist at  for treatment of his metastatic squamous cell cancer.  He remains on immunotherapy, in the form of Keytruda.  He receives dosage of this medication every 6 weeks on a regular basis.  He is scheduled to undergo a repeat PET CT scan in May.\par \par The patient has been having episodes of occasional lightheadedness.  It usually occurs upon awakening in the morning.  Then his symptoms gradually resolve over the course of the next several hours.  He has not had any syncopal episodes.  Of note is the fact, however, is that he is taking 80 mg of valsartan at bedtime every night.  He attempts to remain hydrated.\par \par The patient is having mild allergy symptoms.  He does occasionally use a nasal spray or an over-the-counter antihistamine with some relief.  He continues to have issues with his chronic edema of the right lower extremity.  He is compliant with his compression stocking on a daily basis.\par \par Lastly, over the past several days, he did complain of a sore throat as well as yellow nasal secretions.  Occasionally he would produce yellow sputum.  He denies any fevers, chills, or rigors.  He did have slight urinary frequency.  He was seen at an urgent care center yesterday, where a test for the coronavirus as well as influenza were both negative.  He was given a prescription for Keflex 500 mg twice daily which she did start.  He has 7 days worth of medication.  He now comes in for this assessment.

## 2022-09-22 ENCOUNTER — RX RENEWAL (OUTPATIENT)
Age: 87
End: 2022-09-22

## 2022-10-18 ENCOUNTER — LABORATORY RESULT (OUTPATIENT)
Age: 87
End: 2022-10-18

## 2022-10-28 ENCOUNTER — APPOINTMENT (OUTPATIENT)
Dept: INTERNAL MEDICINE | Facility: CLINIC | Age: 87
End: 2022-10-28

## 2022-10-28 VITALS
HEART RATE: 73 BPM | HEIGHT: 72 IN | OXYGEN SATURATION: 97 % | WEIGHT: 185 LBS | RESPIRATION RATE: 16 BRPM | TEMPERATURE: 97.7 F | BODY MASS INDEX: 25.06 KG/M2 | SYSTOLIC BLOOD PRESSURE: 144 MMHG | DIASTOLIC BLOOD PRESSURE: 79 MMHG

## 2022-10-28 DIAGNOSIS — Z23 ENCOUNTER FOR IMMUNIZATION: ICD-10-CM

## 2022-10-28 PROCEDURE — G0439: CPT

## 2022-10-28 NOTE — PLAN
abdominal pain [FreeTextEntry1] : 1.  Continue with medical regimen as outlined above.\par \par 2.  Patient has already received a flu shot for this season\par \par 3.  Routine cardiovascular exercise including walking as tolerated.\par \par 4.  Continue with compression stockings for treatment of the lower extremity edema\par \par 5.  Follow-up with myself in 6 months with office visit and blood pressure check.

## 2022-10-28 NOTE — HISTORY OF PRESENT ILLNESS
[FreeTextEntry1] : The patient comes in for a yearly wellness evaluation\par  [de-identified] : Patient states, that overall, he is relatively stable.  He continues to struggle with chronic issues due to lymphedema in his right lower extremity.  He wears compression stockings on a daily basis.  He does note, that the edema is slightly improved however.  He tries to elevate his leg is much as possible.  He does have a hydrocele which he probably will not have surgically repaired.\par \par The patient continues to see his oncologist on a regular basis for his metastatic squamous cell carcinoma.  He remains on Keytruda, every 6 weeks.  He denies any fevers or night sweats.  His appetite is good.  There has been no change in bowel habits.\par \par The patient had been complaining of lightheadedness.  At the time of his last visit, his valsartan was switched from 80 mg once daily, to 40 mg twice daily.  The light headedness appears to have improved somewhat.  He denies any acute constitutional symptoms at present.  He now comes in for this assessment.

## 2022-12-15 ENCOUNTER — NON-APPOINTMENT (OUTPATIENT)
Age: 87
End: 2022-12-15

## 2023-02-08 ENCOUNTER — APPOINTMENT (OUTPATIENT)
Dept: INTERNAL MEDICINE | Facility: CLINIC | Age: 88
End: 2023-02-08
Payer: MEDICARE

## 2023-02-08 VITALS
HEIGHT: 72 IN | OXYGEN SATURATION: 98 % | TEMPERATURE: 97 F | BODY MASS INDEX: 25.19 KG/M2 | SYSTOLIC BLOOD PRESSURE: 146 MMHG | DIASTOLIC BLOOD PRESSURE: 62 MMHG | RESPIRATION RATE: 16 BRPM | HEART RATE: 88 BPM | WEIGHT: 186 LBS

## 2023-02-08 DIAGNOSIS — E87.1 HYPO-OSMOLALITY AND HYPONATREMIA: ICD-10-CM

## 2023-02-08 PROCEDURE — 99213 OFFICE O/P EST LOW 20 MIN: CPT

## 2023-02-08 NOTE — REVIEW OF SYSTEMS
[Fever] : no fever [Chills] : no chills [Fatigue] : no fatigue [Night Sweats] : no night sweats [Discharge] : no discharge [Redness] : no redness [Itching] : no itching [Earache] : no earache [Nasal Discharge] : nasal discharge [Sore Throat] : no sore throat [Chest Pain] : no chest pain [Palpitations] : no palpitations [Orthopena] : no orthopnea [Shortness Of Breath] : no shortness of breath [Wheezing] : no wheezing [Cough] : cough [Dyspnea on Exertion] : not dyspnea on exertion [Abdominal Pain] : no abdominal pain [Nausea] : no nausea [Constipation] : no constipation [Diarrhea] : no diarrhea [Vomiting] : no vomiting [Joint Pain] : no joint pain [Headache] : no headache [Dizziness] : no dizziness

## 2023-02-08 NOTE — PHYSICAL EXAM
[No Acute Distress] : no acute distress [Normal Sclera/Conjunctiva] : normal sclera/conjunctiva [Normal Outer Ear/Nose] : the outer ears and nose were normal in appearance [Normal Oropharynx] : the oropharynx was normal [No JVD] : no jugular venous distention [No Lymphadenopathy] : no lymphadenopathy [No Respiratory Distress] : no respiratory distress  [No Accessory Muscle Use] : no accessory muscle use [Normal Rate] : normal rate  [Normal S1, S2] : normal S1 and S2 [No Focal Deficits] : no focal deficits [Normal Affect] : the affect was normal [Normal Insight/Judgement] : insight and judgment were intact [de-identified] : Diminished on the right  [de-identified] : chronic RLE lymphedema

## 2023-02-08 NOTE — PLAN
[FreeTextEntry1] : 1. Continue Doxycycline 100mg PO BID and Augmentin 875mg PO BID x total of 10 days.\par \par 2. Start Tessalon Perle 100mg PO q 8 hours PRN x 5 days.\par \par 3. Repeat CBC and BMP after completion of antibiotics to assess for resolution of leukocytosis and electrolyte imbalance, will follow up results. \par \par 4. Pt advised to return to office if symptoms not improved.

## 2023-02-08 NOTE — HISTORY OF PRESENT ILLNESS
[FreeTextEntry1] : Pneumonia  [de-identified] : 87M w/ PMHx of Anemia, Metastatic Squamous Cell Carcinoma on Keytruda, HTN, HLD, Valvular Heart Disease presents to office today for follow up. Patient was diagnosed with RUL CAP on Saturday, 2/4/23 at [facility]. \par \par Pt reports symptoms started on 1/31/23, he was experiencing fatigue, non-productive cough, and low grade temps, he tested negative for Covid at home, on 2/1/23 he went to urgent care where a RVP was reportedly negative, he was told he had a viral syndrome and advised to return if symptoms progressed. On 2/4/23 he spiked a temp to 101F, had a cough productive of green sputum and general malaise. CXR revealed small patch of PNA RUL, WBC 13k, Na+ 132. He was prescribed Doxycycline 100mg PO BID x 10 days and Augmentin 875mg PO BID x 10 days, he was advised to drink Pedialyte for the hyponatremia and to f/u with his PCP.  He comes in for this assessment. \par \par Today he states he is feeling well, no further temps, cough no longer productive and now only at night. Appetite improved. No longer feeling fatigued.

## 2023-02-24 ENCOUNTER — NON-APPOINTMENT (OUTPATIENT)
Age: 88
End: 2023-02-24

## 2023-02-24 DIAGNOSIS — J18.9 PNEUMONIA, UNSPECIFIED ORGANISM: ICD-10-CM

## 2023-02-24 LAB
ANION GAP SERPL CALC-SCNC: 11 MMOL/L
BASOPHILS # BLD AUTO: 0.06 K/UL
BASOPHILS NFR BLD AUTO: 1 %
BUN SERPL-MCNC: 19 MG/DL
CALCIUM SERPL-MCNC: 9.5 MG/DL
CHLORIDE SERPL-SCNC: 103 MMOL/L
CO2 SERPL-SCNC: 23 MMOL/L
CREAT SERPL-MCNC: 0.93 MG/DL
EGFR: 79 ML/MIN/1.73M2
EOSINOPHIL # BLD AUTO: 0.19 K/UL
EOSINOPHIL NFR BLD AUTO: 3.2 %
GLUCOSE SERPL-MCNC: 118 MG/DL
HCT VFR BLD CALC: 34.9 %
HGB BLD-MCNC: 11.7 G/DL
IMM GRANULOCYTES NFR BLD AUTO: 0.3 %
LYMPHOCYTES # BLD AUTO: 1.58 K/UL
LYMPHOCYTES NFR BLD AUTO: 26.3 %
MAN DIFF?: NORMAL
MCHC RBC-ENTMCNC: 33.1 PG
MCHC RBC-ENTMCNC: 33.5 GM/DL
MCV RBC AUTO: 98.9 FL
MONOCYTES # BLD AUTO: 0.93 K/UL
MONOCYTES NFR BLD AUTO: 15.5 %
NEUTROPHILS # BLD AUTO: 3.22 K/UL
NEUTROPHILS NFR BLD AUTO: 53.7 %
PLATELET # BLD AUTO: 284 K/UL
POTASSIUM SERPL-SCNC: 4.7 MMOL/L
RBC # BLD: 3.53 M/UL
RBC # FLD: 14.2 %
SODIUM SERPL-SCNC: 138 MMOL/L
WBC # FLD AUTO: 6 K/UL

## 2023-03-29 ENCOUNTER — RX RENEWAL (OUTPATIENT)
Age: 88
End: 2023-03-29

## 2023-04-13 ENCOUNTER — APPOINTMENT (OUTPATIENT)
Dept: RADIOLOGY | Facility: CLINIC | Age: 88
End: 2023-04-13
Payer: MEDICARE

## 2023-04-13 ENCOUNTER — OUTPATIENT (OUTPATIENT)
Dept: OUTPATIENT SERVICES | Facility: HOSPITAL | Age: 88
LOS: 1 days | End: 2023-04-13
Payer: MEDICARE

## 2023-04-13 DIAGNOSIS — J18.9 PNEUMONIA, UNSPECIFIED ORGANISM: ICD-10-CM

## 2023-04-13 PROCEDURE — 71046 X-RAY EXAM CHEST 2 VIEWS: CPT

## 2023-04-13 PROCEDURE — 71046 X-RAY EXAM CHEST 2 VIEWS: CPT | Mod: 26

## 2023-04-17 ENCOUNTER — APPOINTMENT (OUTPATIENT)
Dept: CT IMAGING | Facility: CLINIC | Age: 88
End: 2023-04-17
Payer: MEDICARE

## 2023-04-17 ENCOUNTER — OUTPATIENT (OUTPATIENT)
Dept: OUTPATIENT SERVICES | Facility: HOSPITAL | Age: 88
LOS: 1 days | End: 2023-04-17
Payer: MEDICARE

## 2023-04-17 DIAGNOSIS — J18.9 PNEUMONIA, UNSPECIFIED ORGANISM: ICD-10-CM

## 2023-04-17 PROCEDURE — 71250 CT THORAX DX C-: CPT | Mod: 26,MH

## 2023-04-17 PROCEDURE — 71250 CT THORAX DX C-: CPT

## 2023-04-27 ENCOUNTER — NON-APPOINTMENT (OUTPATIENT)
Age: 88
End: 2023-04-27

## 2023-05-04 ENCOUNTER — APPOINTMENT (OUTPATIENT)
Dept: INTERNAL MEDICINE | Facility: CLINIC | Age: 88
End: 2023-05-04
Payer: MEDICARE

## 2023-05-04 VITALS
WEIGHT: 180 LBS | BODY MASS INDEX: 24.38 KG/M2 | DIASTOLIC BLOOD PRESSURE: 62 MMHG | SYSTOLIC BLOOD PRESSURE: 126 MMHG | TEMPERATURE: 98.6 F | HEIGHT: 72 IN | HEART RATE: 89 BPM | OXYGEN SATURATION: 98 %

## 2023-05-04 DIAGNOSIS — R91.8 OTHER NONSPECIFIC ABNORMAL FINDING OF LUNG FIELD: ICD-10-CM

## 2023-05-04 DIAGNOSIS — D64.9 ANEMIA, UNSPECIFIED: ICD-10-CM

## 2023-05-04 DIAGNOSIS — I10 ESSENTIAL (PRIMARY) HYPERTENSION: ICD-10-CM

## 2023-05-04 DIAGNOSIS — I38 ENDOCARDITIS, VALVE UNSPECIFIED: ICD-10-CM

## 2023-05-04 DIAGNOSIS — Z00.00 ENCOUNTER FOR GENERAL ADULT MEDICAL EXAMINATION W/OUT ABNORMAL FINDINGS: ICD-10-CM

## 2023-05-04 PROCEDURE — 99214 OFFICE O/P EST MOD 30 MIN: CPT

## 2023-05-04 RX ORDER — BENZONATATE 100 MG/1
100 CAPSULE ORAL EVERY 8 HOURS
Qty: 15 | Refills: 0 | Status: DISCONTINUED | COMMUNITY
Start: 2023-02-08 | End: 2023-05-04

## 2023-06-16 NOTE — HISTORY OF PRESENT ILLNESS
[FreeTextEntry1] : The patient comes in today for a routine followup evaluation.\par  [de-identified] : The patient states, that he remains stable.  He continues to undergo treatment for his metastatic squamous cell carcinoma.  He remains on Keytruda every 6 weeks.  He has been on this therapy for approximately 4 to 5 years.  His appetite is good.  He continues to be followed with surveillance CAT scans and serial PET scans.  He denies any fevers or night sweats.  His appetite is good.  He has not had any GI symptoms.\par \par The patient did have symptoms of a cough back in February.  He went to an urgent care center where chest x-ray was performed.  He was told that he had pneumonia and he was treated with 14 days of doxycycline.  He was then seen in this office.  We recommended that he complete the course of antibiotics and that he undergo a CAT scan of the chest.  That study was performed in April.  It did reveal nodular opacities in the upper and lower lung zones.  It was felt to most likely be inflammatory in nature.  He notes that he still has a slight cough which only occurs when lying down in bed at night.  There is no significant sputum production.\par \par The patient continues to have issues with the chronic lymphedema in his right lower extremity.  He continues to be compliant with the compression stocking on a daily basis.  He tries to elevate his leg is much as possible.\par \par Patient continues to be followed by cardiology.  He remains on his antihypertensive regimen which now includes valsartan 40 mg a day.  The dosage was decreased from 80 mg.  He denies chest pains or palpitations.  There were no acute constitutional symptoms.  He comes in for this assessment

## 2023-06-16 NOTE — PLAN
[FreeTextEntry1] : 1.  Continue medications outlined above.\par \par 2.  The patient will continue with immunotherapy in the form of Keytruda, under the direction of his oncology team at [facility].\par \par 3.  The patient will obtain the results of his most recent PET/CT scan which was performed at [facility].  He will then bring that result on a disc, to the [facility] radiology facility for comparison.  We will compare the nodular density seen on the current study with those that were described in the past.  They are most likely postinflammatory in nature.  However, a drug-induced complication secondary to the Keytruda, also needs to be considered as well.  Further recommendations will be made after the studies are compared.\par \par 4.  Continue with compression stocking on the right lower extremity\par \par 5.  Follow-up with myself in 6 months with a wellness evaluation and yearly routine fasting blood work.\par \par 6.  The patient will undergo a repeat COVID booster most likely in the fall.

## 2023-06-16 NOTE — PHYSICAL EXAM
[General Appearance - Alert] : alert [General Appearance - Well Developed] : well developed [Outer Ear] : the ears and nose were normal in appearance [Oropharynx] : the oropharynx was normal [Neck Appearance] : the appearance of the neck was normal [Neck Cervical Mass (___cm)] : no neck mass was observed [Jugular Venous Distention Increased] : there was no jugular-venous distention [Thyroid Diffuse Enlargement] : the thyroid was not enlarged [Thyroid Nodule] : there were no palpable thyroid nodules [Auscultation Breath Sounds / Voice Sounds] : lungs were clear to auscultation bilaterally [Heart Sounds] : normal S1 and S2 [Heart Sounds Pericardial Friction Rub] : no pericardial rub [Bowel Sounds] : normal bowel sounds [Abdomen Soft] : soft [Abdomen Tenderness] : non-tender [] : no hepato-splenomegaly [Cervical Lymph Nodes Enlarged Posterior Bilaterally] : posterior cervical [Cervical Lymph Nodes Enlarged Anterior Bilaterally] : anterior cervical [Supraclavicular Lymph Nodes Enlarged Bilaterally] : supraclavicular [Nail Clubbing] : no clubbing  or cyanosis of the fingernails [FreeTextEntry1] : Carotids are 1-2+ bilaterally. There is trace to 1+ edema on the left, and 2+ edema on the right

## 2023-06-19 ENCOUNTER — NON-APPOINTMENT (OUTPATIENT)
Age: 88
End: 2023-06-19

## 2023-09-28 ENCOUNTER — RX RENEWAL (OUTPATIENT)
Age: 88
End: 2023-09-28

## 2023-09-28 RX ORDER — VALSARTAN 40 MG/1
40 TABLET, COATED ORAL TWICE DAILY
Qty: 60 | Refills: 5 | Status: ACTIVE | COMMUNITY
Start: 2020-03-11 | End: 1900-01-01

## 2023-11-01 ENCOUNTER — LABORATORY RESULT (OUTPATIENT)
Age: 88
End: 2023-11-01

## 2023-11-02 ENCOUNTER — NON-APPOINTMENT (OUTPATIENT)
Age: 88
End: 2023-11-02

## 2023-11-02 DIAGNOSIS — R82.81 PYURIA: ICD-10-CM

## 2023-11-07 ENCOUNTER — NON-APPOINTMENT (OUTPATIENT)
Age: 88
End: 2023-11-07

## 2023-11-07 LAB
APPEARANCE: CLEAR
BACTERIA: NEGATIVE /HPF
BILIRUBIN URINE: NEGATIVE
BLOOD URINE: NEGATIVE
CAST: 0 /LPF
COLOR: YELLOW
EPITHELIAL CELLS: 0 /HPF
GLUCOSE QUALITATIVE U: NEGATIVE MG/DL
KETONES URINE: NEGATIVE MG/DL
LEUKOCYTE ESTERASE URINE: NEGATIVE
MICROSCOPIC-UA: NORMAL
NITRITE URINE: NEGATIVE
PH URINE: 6.5
PROTEIN URINE: NEGATIVE MG/DL
RED BLOOD CELLS URINE: 1 /HPF
SPECIFIC GRAVITY URINE: 1.02
UROBILINOGEN URINE: 0.2 MG/DL
WHITE BLOOD CELLS URINE: 1 /HPF

## 2023-11-10 DIAGNOSIS — N39.0 URINARY TRACT INFECTION, SITE NOT SPECIFIED: ICD-10-CM

## 2023-11-10 LAB — BACTERIA UR CULT: ABNORMAL

## 2023-11-27 ENCOUNTER — APPOINTMENT (OUTPATIENT)
Dept: INTERNAL MEDICINE | Facility: CLINIC | Age: 88
End: 2023-11-27
Payer: MEDICARE

## 2023-11-27 VITALS
DIASTOLIC BLOOD PRESSURE: 62 MMHG | TEMPERATURE: 97.8 F | WEIGHT: 186 LBS | SYSTOLIC BLOOD PRESSURE: 136 MMHG | OXYGEN SATURATION: 97 % | RESPIRATION RATE: 16 BRPM | BODY MASS INDEX: 25.19 KG/M2 | HEIGHT: 72 IN | HEART RATE: 90 BPM

## 2023-11-27 DIAGNOSIS — Z87.891 PERSONAL HISTORY OF NICOTINE DEPENDENCE: ICD-10-CM

## 2023-11-27 DIAGNOSIS — Z00.00 ENCOUNTER FOR GENERAL ADULT MEDICAL EXAMINATION W/OUT ABNORMAL FINDINGS: ICD-10-CM

## 2023-11-27 DIAGNOSIS — R82.90 UNSPECIFIED ABNORMAL FINDINGS IN URINE: ICD-10-CM

## 2023-11-27 PROCEDURE — G0439: CPT

## 2023-11-27 PROCEDURE — 99213 OFFICE O/P EST LOW 20 MIN: CPT | Mod: 25

## 2023-11-27 RX ORDER — ZOSTER VACCINE RECOMBINANT, ADJUVANTED 50 MCG/0.5
50 KIT INTRAMUSCULAR
Qty: 1 | Refills: 0 | Status: DISCONTINUED | COMMUNITY
Start: 2022-10-28 | End: 2023-11-27

## 2023-11-27 RX ORDER — CIPROFLOXACIN HYDROCHLORIDE 500 MG/1
500 TABLET, FILM COATED ORAL
Qty: 20 | Refills: 0 | Status: DISCONTINUED | COMMUNITY
Start: 2023-11-10 | End: 2023-11-27

## 2023-11-28 LAB
APPEARANCE: CLEAR
BACTERIA: NEGATIVE /HPF
BILIRUBIN URINE: NEGATIVE
BLOOD URINE: NEGATIVE
CAST: 0 /LPF
COLOR: YELLOW
EPITHELIAL CELLS: 0 /HPF
GLUCOSE QUALITATIVE U: NEGATIVE MG/DL
KETONES URINE: NEGATIVE MG/DL
LEUKOCYTE ESTERASE URINE: ABNORMAL
MICROSCOPIC-UA: NORMAL
NITRITE URINE: NEGATIVE
PH URINE: 5.5
PROTEIN URINE: NEGATIVE MG/DL
RED BLOOD CELLS URINE: 1 /HPF
SPECIFIC GRAVITY URINE: 1.02
UROBILINOGEN URINE: 0.2 MG/DL
WHITE BLOOD CELLS URINE: 0 /HPF

## 2023-11-29 ENCOUNTER — NON-APPOINTMENT (OUTPATIENT)
Age: 88
End: 2023-11-29

## 2023-11-30 LAB — BACTERIA UR CULT: NORMAL

## 2023-12-07 ENCOUNTER — NON-APPOINTMENT (OUTPATIENT)
Age: 88
End: 2023-12-07

## 2024-03-01 ENCOUNTER — RX ONLY (RX ONLY)
Age: 89
End: 2024-03-01

## 2024-03-01 ENCOUNTER — OFFICE (OUTPATIENT)
Facility: LOCATION | Age: 89
Setting detail: OPHTHALMOLOGY
End: 2024-03-01
Payer: MEDICARE

## 2024-03-01 DIAGNOSIS — H35.033: ICD-10-CM

## 2024-03-01 DIAGNOSIS — H02.132: ICD-10-CM

## 2024-03-01 DIAGNOSIS — H02.834: ICD-10-CM

## 2024-03-01 DIAGNOSIS — H43.393: ICD-10-CM

## 2024-03-01 DIAGNOSIS — H35.373: ICD-10-CM

## 2024-03-01 DIAGNOSIS — H02.831: ICD-10-CM

## 2024-03-01 DIAGNOSIS — H16.223: ICD-10-CM

## 2024-03-01 PROCEDURE — 92134 CPTRZ OPH DX IMG PST SGM RTA: CPT | Performed by: OPHTHALMOLOGY

## 2024-03-01 PROCEDURE — 92014 COMPRE OPH EXAM EST PT 1/>: CPT | Performed by: OPHTHALMOLOGY

## 2024-03-01 ASSESSMENT — LID POSITION - DERMATOCHALASIS
OS_DERMATOCHALASIS: LUL T
OD_DERMATOCHALASIS: RUL T

## 2024-03-01 ASSESSMENT — LID POSITION - ECTROPION: OD_ECTROPION: RLL

## 2024-03-12 ASSESSMENT — REFRACTION_CURRENTRX
OS_VPRISM_DIRECTION: SV
OD_OVR_VA: 20/
OD_SPHERE: +2.50
OS_SPHERE: +2.50
OS_CYLINDER: SPHERE
OD_CYLINDER: SPHERE
OD_VPRISM_DIRECTION: SV
OS_OVR_VA: 20/

## 2024-06-18 ENCOUNTER — APPOINTMENT (OUTPATIENT)
Dept: INTERNAL MEDICINE | Facility: CLINIC | Age: 89
End: 2024-06-18

## 2025-01-17 ENCOUNTER — OFFICE (OUTPATIENT)
Facility: LOCATION | Age: OVER 89
Setting detail: OPHTHALMOLOGY
End: 2025-01-17
Payer: MEDICARE

## 2025-01-17 DIAGNOSIS — H02.135: ICD-10-CM

## 2025-01-17 DIAGNOSIS — H02.132: ICD-10-CM

## 2025-01-17 DIAGNOSIS — H43.393: ICD-10-CM

## 2025-01-17 DIAGNOSIS — H16.223: ICD-10-CM

## 2025-01-17 DIAGNOSIS — H35.373: ICD-10-CM

## 2025-01-17 PROBLEM — H02.834 DERMATOCHALSIS; RIGHT UPPER LID, LEFT UPPER LID: Status: ACTIVE | Noted: 2025-01-17

## 2025-01-17 PROBLEM — H02.831 DERMATOCHALSIS; RIGHT UPPER LID, LEFT UPPER LID: Status: ACTIVE | Noted: 2025-01-17

## 2025-01-17 PROCEDURE — 92134 CPTRZ OPH DX IMG PST SGM RTA: CPT | Performed by: OPHTHALMOLOGY

## 2025-01-17 PROCEDURE — 92014 COMPRE OPH EXAM EST PT 1/>: CPT | Performed by: OPHTHALMOLOGY

## 2025-01-17 ASSESSMENT — SUPERFICIAL PUNCTATE KERATITIS (SPK)
OD_SPK: T
OS_SPK: T

## 2025-01-17 ASSESSMENT — TONOMETRY
OD_IOP_MMHG: 17
OS_IOP_MMHG: 17

## 2025-01-17 ASSESSMENT — CONFRONTATIONAL VISUAL FIELD TEST (CVF)
OD_FINDINGS: FULL
OS_FINDINGS: FULL

## 2025-01-17 ASSESSMENT — LID POSITION - ECTROPION
OD_ECTROPION: RLL
OS_ECTROPION: LLL

## 2025-01-17 ASSESSMENT — LID POSITION - DERMATOCHALASIS
OD_DERMATOCHALASIS: RUL T
OS_DERMATOCHALASIS: LUL T

## 2025-01-24 ASSESSMENT — REFRACTION_AUTOREFRACTION
OS_CYLINDER: +1.00
OD_SPHERE: -1.25
OD_CYLINDER: +1.25
OS_SPHERE: -0.75
OD_AXIS: 024
OS_AXIS: 008

## 2025-01-24 ASSESSMENT — REFRACTION_CURRENTRX
OD_VPRISM_DIRECTION: SV
OS_VPRISM_DIRECTION: SV
OD_SPHERE: +2.75
OS_CYLINDER: SPHERE
OD_CYLINDER: SPHERE
OS_OVR_VA: 20/
OS_SPHERE: +2.75
OD_OVR_VA: 20/

## 2025-01-24 ASSESSMENT — VISUAL ACUITY
OS_BCVA: 20/100+2
OD_BCVA: 20/25